# Patient Record
Sex: MALE | Race: OTHER | NOT HISPANIC OR LATINO | Employment: OTHER | URBAN - METROPOLITAN AREA
[De-identification: names, ages, dates, MRNs, and addresses within clinical notes are randomized per-mention and may not be internally consistent; named-entity substitution may affect disease eponyms.]

---

## 2022-02-15 ENCOUNTER — OFFICE VISIT (OUTPATIENT)
Dept: PODIATRY | Facility: CLINIC | Age: 82
End: 2022-02-15
Payer: MEDICARE

## 2022-02-15 VITALS
HEIGHT: 70 IN | BODY MASS INDEX: 24.68 KG/M2 | DIASTOLIC BLOOD PRESSURE: 69 MMHG | HEART RATE: 70 BPM | WEIGHT: 172.38 LBS | SYSTOLIC BLOOD PRESSURE: 121 MMHG

## 2022-02-15 DIAGNOSIS — L60.0 INGROWN NAIL: ICD-10-CM

## 2022-02-15 DIAGNOSIS — E11.49 TYPE II DIABETES MELLITUS WITH NEUROLOGICAL MANIFESTATIONS: Primary | ICD-10-CM

## 2022-02-15 DIAGNOSIS — L60.9 DISEASE OF NAIL: ICD-10-CM

## 2022-02-15 PROBLEM — M19.90 ARTHRITIS: Status: ACTIVE | Noted: 2022-02-15

## 2022-02-15 PROBLEM — M10.9 GOUT: Status: ACTIVE | Noted: 2022-02-15

## 2022-02-15 PROBLEM — I10 BENIGN ESSENTIAL HTN: Status: ACTIVE | Noted: 2021-08-28

## 2022-02-15 PROBLEM — J45.909 ASTHMA: Status: ACTIVE | Noted: 2022-02-15

## 2022-02-15 PROBLEM — I25.10 CORONARY ARTERY DISEASE INVOLVING NATIVE CORONARY ARTERY OF NATIVE HEART WITHOUT ANGINA PECTORIS: Status: ACTIVE | Noted: 2021-08-28

## 2022-02-15 PROBLEM — I73.9 PERIPHERAL ARTERIAL DISEASE: Status: ACTIVE | Noted: 2022-02-15

## 2022-02-15 PROBLEM — E11.65 TYPE 2 DIABETES MELLITUS WITH HYPERGLYCEMIA, WITHOUT LONG-TERM CURRENT USE OF INSULIN: Status: ACTIVE | Noted: 2021-08-28

## 2022-02-15 PROCEDURE — 99203 OFFICE O/P NEW LOW 30 MIN: CPT | Mod: S$GLB,,, | Performed by: PODIATRIST

## 2022-02-15 PROCEDURE — 3074F PR MOST RECENT SYSTOLIC BLOOD PRESSURE < 130 MM HG: ICD-10-PCS | Mod: CPTII,S$GLB,, | Performed by: PODIATRIST

## 2022-02-15 PROCEDURE — 1159F PR MEDICATION LIST DOCUMENTED IN MEDICAL RECORD: ICD-10-PCS | Mod: CPTII,S$GLB,, | Performed by: PODIATRIST

## 2022-02-15 PROCEDURE — 1125F AMNT PAIN NOTED PAIN PRSNT: CPT | Mod: CPTII,S$GLB,, | Performed by: PODIATRIST

## 2022-02-15 PROCEDURE — 1159F MED LIST DOCD IN RCRD: CPT | Mod: CPTII,S$GLB,, | Performed by: PODIATRIST

## 2022-02-15 PROCEDURE — 99203 PR OFFICE/OUTPT VISIT, NEW, LEVL III, 30-44 MIN: ICD-10-PCS | Mod: S$GLB,,, | Performed by: PODIATRIST

## 2022-02-15 PROCEDURE — 3074F SYST BP LT 130 MM HG: CPT | Mod: CPTII,S$GLB,, | Performed by: PODIATRIST

## 2022-02-15 PROCEDURE — 3078F PR MOST RECENT DIASTOLIC BLOOD PRESSURE < 80 MM HG: ICD-10-PCS | Mod: CPTII,S$GLB,, | Performed by: PODIATRIST

## 2022-02-15 PROCEDURE — 3078F DIAST BP <80 MM HG: CPT | Mod: CPTII,S$GLB,, | Performed by: PODIATRIST

## 2022-02-15 PROCEDURE — 1125F PR PAIN SEVERITY QUANTIFIED, PAIN PRESENT: ICD-10-PCS | Mod: CPTII,S$GLB,, | Performed by: PODIATRIST

## 2022-02-15 RX ORDER — OMEPRAZOLE 20 MG/1
20 CAPSULE, DELAYED RELEASE ORAL DAILY
COMMUNITY

## 2022-02-15 RX ORDER — ASPIRIN 81 MG/1
81 TABLET ORAL
COMMUNITY
Start: 2021-09-03

## 2022-02-15 RX ORDER — GABAPENTIN 400 MG/1
400 CAPSULE ORAL 4 TIMES DAILY
COMMUNITY
Start: 2021-10-18

## 2022-02-15 RX ORDER — METFORMIN HYDROCHLORIDE 500 MG/1
TABLET, EXTENDED RELEASE ORAL
COMMUNITY
Start: 2022-02-08

## 2022-02-15 RX ORDER — TAMSULOSIN HYDROCHLORIDE 0.4 MG/1
CAPSULE ORAL
COMMUNITY
Start: 2021-04-28

## 2022-02-15 RX ORDER — FLUTICASONE PROPIONATE AND SALMETEROL XINAFOATE 45; 21 UG/1; UG/1
2 AEROSOL, METERED RESPIRATORY (INHALATION) DAILY
COMMUNITY
Start: 2021-07-01

## 2022-02-15 RX ORDER — MONTELUKAST SODIUM 10 MG/1
TABLET ORAL
COMMUNITY
Start: 2022-01-28

## 2022-02-15 RX ORDER — CLOPIDOGREL BISULFATE 75 MG/1
75 TABLET ORAL DAILY
COMMUNITY
Start: 2021-12-13

## 2022-02-15 RX ORDER — EVOLOCUMAB 140 MG/ML
140 INJECTION, SOLUTION SUBCUTANEOUS
COMMUNITY
Start: 2022-01-17

## 2022-02-15 NOTE — PROCEDURES
"Routine Foot Care    Date/Time: 2/15/2022 10:45 AM  Performed by: Calixto Escalante DPM  Authorized by: Calixto Escalante DPM     Time out: Immediately prior to procedure a "time out" was called to verify the correct patient, procedure, equipment, support staff and site/side marked as required.    Consent Done?:  Yes (Verbal)  Hyperkeratotic Skin Lesions?: No      Nail Care Type:  Debride  Location(s): All  (Left 1st Toe, Left 3rd Toe, Left 2nd Toe, Left 4th Toe, Left 5th Toe, Right 1st Toe, Right 2nd Toe, Right 3rd Toe, Right 4th Toe and Right 5th Toe)  Patient tolerance:  Patient tolerated the procedure well with no immediate complications      "

## 2022-02-15 NOTE — PROGRESS NOTES
Subjective:       Patient ID: Devan Bautista is a 81 y.o. male.    Chief Complaint: Ingrown Toenail (Possible right first ingrown toenail), Diabetic Foot Exam, and Nail Care    Patient presents to [facility] complaining of possible ingrown toenail right first digit. Patient states that he has been taking bactrim for possible infection to right first digit without any complaints. Patient denies current pain or drainage from the area. He does report possible trauma to the nail plate. 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    PCP:  Marco Brown DO last visit 1/2022    Past Medical History:   Diagnosis Date    Diabetes mellitus     PAD (peripheral artery disease)      History reviewed. No pertinent surgical history.  History reviewed. No pertinent family history.  Social History     Socioeconomic History    Marital status:        Current Outpatient Medications   Medication Sig Dispense Refill    clopidogreL (PLAVIX) 75 mg tablet Take 75 mg by mouth once daily.      evolocumab (REPATHA SYRINGE) 140 mg/mL Syrg 140 mg.      fluticasone propion-salmeterol 45-21 mcg/dose (ADVAIR HFA) 45-21 mcg/actuation HFAA inhaler Inhale 2 puffs into the lungs once daily.      gabapentin (NEURONTIN) 400 MG capsule Take 400 mg by mouth 4 (four) times daily.      metFORMIN (GLUCOPHAGE-XR) 500 MG ER 24hr tablet   0 Refill(s), Maintenance      montelukast (SINGULAIR) 10 mg tablet   = 1 tab, Oral, qPM, # 90 tab, 0 Refill(s), Pharmacy: [facility], 177, cm, 01/17/22 8:23:00 CST, Height/Length Measured, 77.3, kg, 01/17/22 8:23:00 CST, Weight Dosing      omeprazole (PRILOSEC) 20 MG capsule Take 20 mg by mouth once daily.      tamsulosin (FLOMAX) 0.4 mg Cap   See Instructions, TAKE 1 CAPSULE EVERY DAY, # 90 cap, 3 Refill(s), Maintenance, Pharmacy: [facility], 177, cm, 04/12/21 8:15:00 CDT, Height/Length Measured, 77.8, kg, 04/12/21 8:15:00 CDT, Weight Dosing   "    aspirin (ECOTRIN) 81 MG EC tablet Take 81 mg by mouth.       No current facility-administered medications for this visit.     Review of patient's allergies indicates:   Allergen Reactions    Statins-hmg-coa reductase inhibitors      Other reaction(s): Leg pain    Acetaminophen Hives       Review of Systems   Constitutional: Negative for chills and fever.   Gastrointestinal: Positive for nausea. Negative for diarrhea and vomiting.   Neurological: Positive for numbness (in bilateral feet).       Objective:      Vitals:    02/15/22 1103   BP: 121/69   Pulse: 70   Weight: 78.2 kg (172 lb 6.4 oz)   Height: 5' 9.5" (1.765 m)     Physical Exam  Constitutional:       General: He is not in acute distress.     Appearance: Normal appearance. He is not ill-appearing.   HENT:      Head: Normocephalic.   Pulmonary:      Effort: No respiratory distress.   Musculoskeletal:         General: No swelling.      Right lower leg: No edema.      Left lower leg: No edema.   Skin:     Capillary Refill: Capillary refill takes 2 to 3 seconds.   Neurological:      Mental Status: He is alert and oriented to person, place, and time.   Psychiatric:         Mood and Affect: Mood normal.         Behavior: Behavior normal.         Thought Content: Thought content normal.       Pedal Examination:  Neuro: Light touch sensation intact both feet, [place] ely decreased to distal digits  Vasc: DP and PT pulses palpable 2/4. No hair growth noted. Capillary fill time less than 3 seconds, skin thin shiny and atrophic, + varicosities noted, skin temperature warm to cool form proximal to distal bilateral foot  Musc: 5/5 muscle strength, minimal pain on palpation right first digit nail plate/ proximal medial nail fold; no masses noted bilateral foot  Derm: Evidence of mild hematoma to right first digit nail plate, mild proximal - central onycholysis noted, no drainage, mild erythema noted proximal medial nail fold right first digit; no open wounds, " no rashes noted; Nails are thickened, elongated with subungual debris 1-5 bilateral foot       Assessment:       1. Type II diabetes mellitus with neurological manifestations    2. Ingrown nail - Right Foot    3. Disease of nail        Plan:       1. Patient examined and evaluated  2. Discussed diabetes mellitus and proper fitting shoe gear, daily foot monitoring and adherence to diabetic medication regimen  3. Patient was advised to complete oral Bactrim to resolve any possible infection right 1st digit nail plate.  4. Patient will continue to monitor right 1st digit nail plate for possible ingrown toenail he was made aware secondary to his history of vascular changes and diabetes mellitus we will postpone removing the nail at this time.  5. Patient will return to clinic in 3 weeks for further evaluation for right first digit nail plate with pending possible nail avulsion/ patient will return to clinic in 3 months for diabetic foot examination

## 2022-03-09 ENCOUNTER — OFFICE VISIT (OUTPATIENT)
Dept: PODIATRY | Facility: CLINIC | Age: 82
End: 2022-03-09
Payer: MEDICARE

## 2022-03-09 VITALS
SYSTOLIC BLOOD PRESSURE: 130 MMHG | WEIGHT: 174 LBS | HEART RATE: 83 BPM | DIASTOLIC BLOOD PRESSURE: 72 MMHG | BODY MASS INDEX: 24.91 KG/M2 | HEIGHT: 70 IN

## 2022-03-09 DIAGNOSIS — L60.0 INGROWN NAIL: ICD-10-CM

## 2022-03-09 DIAGNOSIS — E11.42 DIABETIC POLYNEUROPATHY ASSOCIATED WITH TYPE 2 DIABETES MELLITUS: ICD-10-CM

## 2022-03-09 DIAGNOSIS — S90.111D CONTUSION OF RIGHT GREAT TOE WITHOUT DAMAGE TO NAIL, SUBSEQUENT ENCOUNTER: Primary | ICD-10-CM

## 2022-03-09 DIAGNOSIS — E11.51 TYPE II DIABETES MELLITUS WITH PERIPHERAL CIRCULATORY DISORDER: ICD-10-CM

## 2022-03-09 PROCEDURE — 1126F AMNT PAIN NOTED NONE PRSNT: CPT | Mod: CPTII,S$GLB,, | Performed by: PODIATRIST

## 2022-03-09 PROCEDURE — 1126F PR PAIN SEVERITY QUANTIFIED, NO PAIN PRESENT: ICD-10-PCS | Mod: CPTII,S$GLB,, | Performed by: PODIATRIST

## 2022-03-09 PROCEDURE — 99212 OFFICE O/P EST SF 10 MIN: CPT | Mod: S$GLB,,, | Performed by: PODIATRIST

## 2022-03-09 PROCEDURE — 3075F PR MOST RECENT SYSTOLIC BLOOD PRESS GE 130-139MM HG: ICD-10-PCS | Mod: CPTII,S$GLB,, | Performed by: PODIATRIST

## 2022-03-09 PROCEDURE — 99212 PR OFFICE/OUTPT VISIT, EST, LEVL II, 10-19 MIN: ICD-10-PCS | Mod: S$GLB,,, | Performed by: PODIATRIST

## 2022-03-09 PROCEDURE — 3075F SYST BP GE 130 - 139MM HG: CPT | Mod: CPTII,S$GLB,, | Performed by: PODIATRIST

## 2022-03-09 PROCEDURE — 1159F MED LIST DOCD IN RCRD: CPT | Mod: CPTII,S$GLB,, | Performed by: PODIATRIST

## 2022-03-09 PROCEDURE — 3078F DIAST BP <80 MM HG: CPT | Mod: CPTII,S$GLB,, | Performed by: PODIATRIST

## 2022-03-09 PROCEDURE — 3078F PR MOST RECENT DIASTOLIC BLOOD PRESSURE < 80 MM HG: ICD-10-PCS | Mod: CPTII,S$GLB,, | Performed by: PODIATRIST

## 2022-03-09 PROCEDURE — 1159F PR MEDICATION LIST DOCUMENTED IN MEDICAL RECORD: ICD-10-PCS | Mod: CPTII,S$GLB,, | Performed by: PODIATRIST

## 2022-03-09 NOTE — PROGRESS NOTES
Subjective:       Patient ID: Devan Bautista is a 81 y.o. male.    Chief Complaint: Follow-up    Patient presents to clinic for follow-up evaluation of right 1st digit nail pain.  Patient reports improved symptoms from the area and states that the tenderness has resolved.  Patient states that he completed his oral Bactrim with no adverse effects and improvement of the visual appearance of his right 1st digit.  Patient reports previous traumatic event to the right 1st digit nail plate which may have led to his symptoms. Patient states that he is adhering strictly to his diabetic medication regimen.  Patient denies any other pedal complaints.      Past Medical History:   Diagnosis Date    Diabetes mellitus     PAD (peripheral artery disease)      History reviewed. No pertinent surgical history.  History reviewed. No pertinent family history.  Social History     Socioeconomic History    Marital status:    Tobacco Use    Smoking status: Never Smoker    Smokeless tobacco: Never Used   Substance and Sexual Activity    Alcohol use: Not Currently    Drug use: Never    Sexual activity: Yes       Current Outpatient Medications   Medication Sig Dispense Refill    aspirin (ECOTRIN) 81 MG EC tablet Take 81 mg by mouth.      clopidogreL (PLAVIX) 75 mg tablet Take 75 mg by mouth once daily.      evolocumab (REPATHA SYRINGE) 140 mg/mL Syrg 140 mg.      fluticasone propion-salmeterol 45-21 mcg/dose (ADVAIR HFA) 45-21 mcg/actuation HFAA inhaler Inhale 2 puffs into the lungs once daily.      gabapentin (NEURONTIN) 400 MG capsule Take 400 mg by mouth 4 (four) times daily.      metFORMIN (GLUCOPHAGE-XR) 500 MG ER 24hr tablet   0 Refill(s), Maintenance      montelukast (SINGULAIR) 10 mg tablet   = 1 tab, Oral, qPM, # 90 tab, 0 Refill(s), Pharmacy: [facility], 177, cm, 01/17/22 8:23:00 CST, Height/Length Measured, 77.3, kg, 01/17/22 8:23:00 CST, Weight Dosing      omeprazole (PRILOSEC) 20 MG capsule  "Take 20 mg by mouth once daily.      tamsulosin (FLOMAX) 0.4 mg Cap   See Instructions, TAKE 1 CAPSULE EVERY DAY, # 90 cap, 3 Refill(s), Maintenance, [facility], 177, cm, 04/12/21 8:15:00 CDT, Height/Length Measured, 77.8, kg, 04/12/21 8:15:00 CDT, Weight Dosing       No current facility-administered medications for this visit.     Review of patient's allergies indicates:   Allergen Reactions    Statins-hmg-coa reductase inhibitors      Other reaction(s): Leg pain    Acetaminophen Hives       Review of Systems   Constitutional: Negative for chills and fever.   Respiratory: Negative for cough and shortness of breath.    Cardiovascular: Negative for chest pain and leg swelling.   Gastrointestinal: Negative for constipation, nausea and vomiting.       Objective:      Vitals:    03/09/22 0939   BP: 130/72   Pulse: 83   Weight: 78.9 kg (174 lb)   Height: 5' 9.5" (1.765 m)     Physical Exam  Vitals reviewed.   Constitutional:       General: He is not in acute distress.     Appearance: He is not ill-appearing.   HENT:      Head: Normocephalic.   Pulmonary:      Effort: Pulmonary effort is normal. No respiratory distress.   Musculoskeletal:      Right lower leg: No edema.      Left lower leg: No edema.   Skin:     Capillary Refill: Capillary refill takes less than 2 seconds.   Neurological:      Mental Status: He is alert and oriented to person, place, and time.   Psychiatric:         Mood and Affect: Mood normal.         Behavior: Behavior normal.         Thought Content: Thought content normal.       Pedal Examination:  Neuro:  Protective and light touch sensation decreased to the bilateral lower extremity, no paresthesias noted  Vasc:  DP and PT pulses palpable 2/4 bilateral foot, capillary fill time less than 3 seconds bilateral digit, pedal hair growth absent, no edema noted to the foot or ankle, skin temperature gradient warm to cool proximal to distal bilateral foot  Musc:  5/5 muscle " strength noted to bilateral foot, no masses or tenderness noted bilateral foot, no pain on deep palpation of the right 1st digit nail plate medially laterally or dorsally  Derm:  Evidence of lysis of a very small portion of the proximal medial nail fold right 1st digit nail plate with evidence of previous mild hematoma encompassing about 1% of the nail plate, nails 1 through 5 bilateral within normal limits of length but are discolored and thickened.  No open lesions or rashes noted bilateral foot, pedal skin thin and shiny, no evidence of increased ingrown nature or further loosening of right 1st digit nail plate at the medial proximal nail fold; no signs of infection noted to the right 1st digit nail plate           Assessment:       1. Contusion of right great toe without damage to nail, subsequent encounter    2. Ingrown nail    3. Type II diabetes mellitus with peripheral circulatory disorder    4. Diabetic polyneuropathy associated with type 2 diabetes mellitus        Plan:       1. Patient was examined and evaluated   2. The patient was made aware that all symptoms have resolved from his right 1st digit nail plate following a previous possible nail contusion/ ingrown toenail; he was made aware that he more likely suffered a toe contusion which has resolved and shows no signs of infection  3. Patient will continue to monitor the area for progression of symptoms; he will contact the clinic in the event of any adverse event  4. Patient will follow-up as previously scheduled 3 month diabetic foot examination appointment or p.r.n. for complaints

## 2022-06-09 ENCOUNTER — OFFICE VISIT (OUTPATIENT)
Dept: PODIATRY | Facility: CLINIC | Age: 82
End: 2022-06-09
Payer: MEDICARE

## 2022-06-09 VITALS
BODY MASS INDEX: 24.91 KG/M2 | HEART RATE: 83 BPM | WEIGHT: 174 LBS | HEIGHT: 70 IN | DIASTOLIC BLOOD PRESSURE: 74 MMHG | SYSTOLIC BLOOD PRESSURE: 125 MMHG

## 2022-06-09 DIAGNOSIS — E11.42 DIABETIC POLYNEUROPATHY ASSOCIATED WITH TYPE 2 DIABETES MELLITUS: Primary | ICD-10-CM

## 2022-06-09 DIAGNOSIS — I73.9 PERIPHERAL VASCULAR DISEASE: ICD-10-CM

## 2022-06-09 DIAGNOSIS — E11.49 TYPE II DIABETES MELLITUS WITH NEUROLOGICAL MANIFESTATIONS: ICD-10-CM

## 2022-06-09 DIAGNOSIS — L60.9 DISEASE OF NAIL: ICD-10-CM

## 2022-06-09 PROCEDURE — 11721 DEBRIDE NAIL 6 OR MORE: CPT | Mod: Q9,S$GLB,, | Performed by: PODIATRIST

## 2022-06-09 PROCEDURE — 3078F PR MOST RECENT DIASTOLIC BLOOD PRESSURE < 80 MM HG: ICD-10-PCS | Mod: CPTII,S$GLB,, | Performed by: PODIATRIST

## 2022-06-09 PROCEDURE — 1126F PR PAIN SEVERITY QUANTIFIED, NO PAIN PRESENT: ICD-10-PCS | Mod: CPTII,S$GLB,, | Performed by: PODIATRIST

## 2022-06-09 PROCEDURE — 3078F DIAST BP <80 MM HG: CPT | Mod: CPTII,S$GLB,, | Performed by: PODIATRIST

## 2022-06-09 PROCEDURE — 1160F PR REVIEW ALL MEDS BY PRESCRIBER/CLIN PHARMACIST DOCUMENTED: ICD-10-PCS | Mod: CPTII,S$GLB,, | Performed by: PODIATRIST

## 2022-06-09 PROCEDURE — 99213 OFFICE O/P EST LOW 20 MIN: CPT | Mod: 25,S$GLB,, | Performed by: PODIATRIST

## 2022-06-09 PROCEDURE — 3074F SYST BP LT 130 MM HG: CPT | Mod: CPTII,S$GLB,, | Performed by: PODIATRIST

## 2022-06-09 PROCEDURE — 3051F PR MOST RECENT HEMOGLOBIN A1C LEVEL 7.0 - < 8.0%: ICD-10-PCS | Mod: CPTII,S$GLB,, | Performed by: PODIATRIST

## 2022-06-09 PROCEDURE — 3074F PR MOST RECENT SYSTOLIC BLOOD PRESSURE < 130 MM HG: ICD-10-PCS | Mod: CPTII,S$GLB,, | Performed by: PODIATRIST

## 2022-06-09 PROCEDURE — 1159F PR MEDICATION LIST DOCUMENTED IN MEDICAL RECORD: ICD-10-PCS | Mod: CPTII,S$GLB,, | Performed by: PODIATRIST

## 2022-06-09 PROCEDURE — 1159F MED LIST DOCD IN RCRD: CPT | Mod: CPTII,S$GLB,, | Performed by: PODIATRIST

## 2022-06-09 PROCEDURE — 1126F AMNT PAIN NOTED NONE PRSNT: CPT | Mod: CPTII,S$GLB,, | Performed by: PODIATRIST

## 2022-06-09 PROCEDURE — 1160F RVW MEDS BY RX/DR IN RCRD: CPT | Mod: CPTII,S$GLB,, | Performed by: PODIATRIST

## 2022-06-09 PROCEDURE — 99213 PR OFFICE/OUTPT VISIT, EST, LEVL III, 20-29 MIN: ICD-10-PCS | Mod: 25,S$GLB,, | Performed by: PODIATRIST

## 2022-06-09 PROCEDURE — 11721 ROUTINE FOOT CARE: ICD-10-PCS | Mod: Q9,S$GLB,, | Performed by: PODIATRIST

## 2022-06-09 PROCEDURE — 3051F HG A1C>EQUAL 7.0%<8.0%: CPT | Mod: CPTII,S$GLB,, | Performed by: PODIATRIST

## 2022-06-15 NOTE — PROGRESS NOTES
Subjective:      Patient ID: Devan Bautista is a 82 y.o. male.    Chief Complaint: Follow-up and Diabetes Mellitus    Devan is a 82 y.o. male who presents to the clinic for evaluation and treatment of high risk feet. Devan has a past medical history of Diabetes mellitus and PAD (peripheral artery disease). The patient's chief complaint is long, thick toenails. This patient has documented high risk feet requiring routine maintenance secondary to peripheral neuropathy.    PCP: Marco Brown DO    Date Last Seen by PCP: 05/18/2022    Current shoe gear:  Affected Foot: Extra depth shoes     Unaffected Foot: Extra depth shoes    Hemoglobin A1C   Date Value Ref Range Status   08/28/2021 7.2 (H) 4.2 - 6.3 % Final       Review of Systems   Constitutional: Negative for chills and fever.   Cardiovascular: Negative for chest pain.   Respiratory: Negative for cough.    Gastrointestinal: Negative for diarrhea, nausea and vomiting.   Neurological: Positive for numbness (bilateral foot) and paresthesias.           Objective:      Physical Exam  Vitals reviewed.   Constitutional:       General: He is not in acute distress.     Appearance: Normal appearance. He is not ill-appearing.   HENT:      Head: Normocephalic.      Nose: Nose normal.   Cardiovascular:      Rate and Rhythm: Normal rate.   Pulmonary:      Effort: Pulmonary effort is normal. No respiratory distress.   Skin:     Capillary Refill: Capillary refill takes 2 to 3 seconds.   Neurological:      Mental Status: He is alert and oriented to person, place, and time.   Psychiatric:         Mood and Affect: Mood normal.         Behavior: Behavior normal.         Thought Content: Thought content normal.         Judgment: Judgment normal.       Neurologic:  Light touch sensation intact bilateral foot, protective sensation is diminished to distal aspect the digits bilateral foot, positive paresthesias noted bilateral foot  Musculoskeletal:  5/5 muscle strength noted bilateral  foot, ankle joint range of motion is reduced without pain  Vascular:  DP and PT pulses palpable 2/4 bilateral foot, pedal hair growth is absent bilateral foot, positive varicose [place] and telangiectasias bilateral foot, skin temperature gradient warm to cool from proximal distal bilateral foot  Dermatologic:  Nails 1 through 5 bilateral thickened elongated and discolored with the presence of subungual debris, no open lesions noted bilateral foot, no rashes noted bilateral foot, pedal skin is thin shiny atrophic          Assessment:       Encounter Diagnoses   Name Primary?    Diabetic polyneuropathy associated with type 2 diabetes mellitus Yes    Type II diabetes mellitus with neurological manifestations     Disease of nail     Peripheral vascular disease          Plan:       Devan was seen today for follow-up and diabetes mellitus.    Diagnoses and all orders for this visit:    Diabetic polyneuropathy associated with type 2 diabetes mellitus    Type II diabetes mellitus with neurological manifestations    Disease of nail    Peripheral vascular disease      I counseled the patient on his conditions, their implications and medical management.      1. Patient was examined and evaluated  2. Patient was advised to monitor feet on a daily basis, continue proper glycemic control, continue attempts in lawn hemoglobin A1c, and adherence to diabetic medication regimen.  Routine Foot Care    Date/Time: 6/9/2022 11:00 AM  Performed by: Calixto Escalante DPM  Authorized by: Calixto Escalante DPM     Consent Done?:  Yes (Verbal)  Hyperkeratotic Skin Lesions?: No      Nail Care Type:  Debride  Location(s): All  (Left 1st Toe, Left 3rd Toe, Left 2nd Toe, Left 4th Toe, Left 5th Toe, Right 1st Toe, Right 2nd Toe, Right 3rd Toe, Right 4th Toe and Right 5th Toe)  Patient tolerance:  Patient tolerated the procedure well with no immediate complications      3. Patient advised continue with topical treatment for nail fungal  changes.  Patient will continue with oral medication for improvement of peripheral neuropathy.  Patient was advised to elevate lower extremity when at rest and consider compression therapy for improvement of vascular changes.  4. Patient will follow-up in 3 months or p.r.n. for foot complaints  .

## 2022-10-05 ENCOUNTER — OFFICE VISIT (OUTPATIENT)
Dept: PODIATRY | Facility: CLINIC | Age: 82
End: 2022-10-05
Payer: MEDICARE

## 2022-10-05 VITALS
BODY MASS INDEX: 24.91 KG/M2 | DIASTOLIC BLOOD PRESSURE: 68 MMHG | HEIGHT: 70 IN | WEIGHT: 174 LBS | SYSTOLIC BLOOD PRESSURE: 128 MMHG | HEART RATE: 78 BPM

## 2022-10-05 DIAGNOSIS — E11.51 TYPE II DIABETES MELLITUS WITH PERIPHERAL CIRCULATORY DISORDER: ICD-10-CM

## 2022-10-05 DIAGNOSIS — I73.9 PERIPHERAL VASCULAR DISEASE: Primary | ICD-10-CM

## 2022-10-05 DIAGNOSIS — L60.9 DISEASE OF NAIL: ICD-10-CM

## 2022-10-05 PROCEDURE — 1159F PR MEDICATION LIST DOCUMENTED IN MEDICAL RECORD: ICD-10-PCS | Mod: CPTII,S$GLB,, | Performed by: PODIATRIST

## 2022-10-05 PROCEDURE — 3078F PR MOST RECENT DIASTOLIC BLOOD PRESSURE < 80 MM HG: ICD-10-PCS | Mod: CPTII,S$GLB,, | Performed by: PODIATRIST

## 2022-10-05 PROCEDURE — 1159F MED LIST DOCD IN RCRD: CPT | Mod: CPTII,S$GLB,, | Performed by: PODIATRIST

## 2022-10-05 PROCEDURE — 99213 PR OFFICE/OUTPT VISIT, EST, LEVL III, 20-29 MIN: ICD-10-PCS | Mod: 25,S$GLB,, | Performed by: PODIATRIST

## 2022-10-05 PROCEDURE — 11721 ROUTINE FOOT CARE: ICD-10-PCS | Mod: Q9,S$GLB,, | Performed by: PODIATRIST

## 2022-10-05 PROCEDURE — 11721 DEBRIDE NAIL 6 OR MORE: CPT | Mod: Q9,S$GLB,, | Performed by: PODIATRIST

## 2022-10-05 PROCEDURE — 1125F AMNT PAIN NOTED PAIN PRSNT: CPT | Mod: CPTII,S$GLB,, | Performed by: PODIATRIST

## 2022-10-05 PROCEDURE — 3074F PR MOST RECENT SYSTOLIC BLOOD PRESSURE < 130 MM HG: ICD-10-PCS | Mod: CPTII,S$GLB,, | Performed by: PODIATRIST

## 2022-10-05 PROCEDURE — 1125F PR PAIN SEVERITY QUANTIFIED, PAIN PRESENT: ICD-10-PCS | Mod: CPTII,S$GLB,, | Performed by: PODIATRIST

## 2022-10-05 PROCEDURE — 3078F DIAST BP <80 MM HG: CPT | Mod: CPTII,S$GLB,, | Performed by: PODIATRIST

## 2022-10-05 PROCEDURE — 99213 OFFICE O/P EST LOW 20 MIN: CPT | Mod: 25,S$GLB,, | Performed by: PODIATRIST

## 2022-10-05 PROCEDURE — 3074F SYST BP LT 130 MM HG: CPT | Mod: CPTII,S$GLB,, | Performed by: PODIATRIST

## 2022-10-05 NOTE — PROGRESS NOTES
Subjective:      Patient ID: Devan Bautista is a 82 y.o. male.    Chief Complaint: Diabetes Mellitus and Routine Foot Care    Devan is a 82 y.o. male who presents to the clinic for evaluation and treatment of high risk feet. Devan has a past medical history of Diabetes mellitus and PAD (peripheral artery disease). The patient's chief complaint is long, thick toenails. This patient has documented high risk feet requiring routine maintenance secondary to peripheral neuropathy.  Patient reports pain and tenderness in the right 1st digit nail plate.    PCP: Marco Brown DO    Date Last Seen by PCP: 08/31/2022    Current shoe gear:  Affected Foot: Tennis shoes     Unaffected Foot: Tennis shoes    Hemoglobin A1C   Date Value Ref Range Status   08/28/2021 7.2 (H) 4.2 - 6.3 % Final       Review of Systems   Constitutional: Negative for chills and fever.   Cardiovascular:  Negative for chest pain and leg swelling.   Respiratory:  Negative for cough and shortness of breath.    Gastrointestinal:  Negative for diarrhea, nausea and vomiting.         Objective:      Physical Exam  Vitals reviewed.   Constitutional:       General: He is not in acute distress.     Appearance: Normal appearance. He is not ill-appearing.   HENT:      Head: Normocephalic.      Nose: Nose normal.   Cardiovascular:      Rate and Rhythm: Normal rate.   Pulmonary:      Effort: Pulmonary effort is normal. No respiratory distress.   Skin:     Capillary Refill: Capillary refill takes 2 to 3 seconds.   Neurological:      Mental Status: He is alert and oriented to person, place, and time.   Psychiatric:         Mood and Affect: Mood normal.         Behavior: Behavior normal.         Thought Content: Thought content normal.        Neurologic:  Light touch sensation intact bilateral foot, protective sensation is diminished to distal aspect the digits bilateral foot, positive paresthesias reported bilateral foot  Musculoskeletal:  5/5 muscle strength noted  bilateral foot, ankle joint range of motion is reduced without pain  Vascular:  DP and PT pulses palpable 2/4 bilateral foot, pedal hair growth is absent bilateral foot, positive varicosities and telangiectasias bilateral foot, skin temperature gradient warm to cool from proximal distal bilateral foot  Dermatologic:  Nails 1 through 5 bilateral thickened elongated and discolored with the presence of subungual debris, no open lesions noted bilateral foot, no rashes noted bilateral foot, pedal skin is thin shiny atrophic, right 1st digit nail plate shows signs of previous trauma with lateral splitting and thickening of the nail plate about 1/2 the distance no lysis of nail plate noted however        Assessment:       Encounter Diagnoses   Name Primary?    Peripheral vascular disease Yes    Type II diabetes mellitus with peripheral circulatory disorder     Disease of nail          Plan:       Devan was seen today for diabetes mellitus and routine foot care.    Diagnoses and all orders for this visit:    Peripheral vascular disease    Type II diabetes mellitus with peripheral circulatory disorder    Disease of nail    I counseled the patient on his conditions, their implications and medical management.        1. Patient was examined and evaluated  2. Patient made aware of double nail plate noted to right 1st digit which may be related to previous contusion to the right 1st digit.  Upon debridement of that nail plate the top layer of the nail fell off in the area looks more anatomically correct and patient reports relief of pain symptoms from that area  Routine Foot Care    Date/Time: 10/5/2022 10:15 AM  Performed by: Calixto Escalante DPM  Authorized by: Calixto Escalante DPM     Consent Done?:  Yes (Verbal)  Hyperkeratotic Skin Lesions?: No      Nail Care Type:  Debride  Location(s): All  (Left 1st Toe, Left 3rd Toe, Left 2nd Toe, Left 4th Toe, Left 5th Toe, Right 1st Toe, Right 2nd Toe, Right 3rd Toe, Right 4th Toe  and Right 5th Toe)  Patient tolerance:  Patient tolerated the procedure well with no immediate complications   3. Patient was advised to continue with daily monitoring of feet.  Patient will continue with comfortable shoe gear both inside and outside the home.  Patient will attempt proper glycemic control, lowering of hemoglobin A1c, and adherence to diabetic medication regimen  4. Patient was advised to begin topical OTC fungal therapies including Vicks vapor rub.    5. Patient will follow-up in 3 months or p.r.n. foot complaints

## 2023-08-17 ENCOUNTER — OFFICE VISIT (OUTPATIENT)
Dept: PODIATRY | Facility: CLINIC | Age: 83
End: 2023-08-17
Payer: MEDICARE

## 2023-08-17 VITALS
WEIGHT: 174 LBS | BODY MASS INDEX: 24.91 KG/M2 | HEIGHT: 70 IN | DIASTOLIC BLOOD PRESSURE: 72 MMHG | SYSTOLIC BLOOD PRESSURE: 124 MMHG | HEART RATE: 79 BPM

## 2023-08-17 DIAGNOSIS — E11.51 TYPE II DIABETES MELLITUS WITH PERIPHERAL CIRCULATORY DISORDER: ICD-10-CM

## 2023-08-17 DIAGNOSIS — L60.9 DISEASE OF NAIL: ICD-10-CM

## 2023-08-17 DIAGNOSIS — E11.42 DIABETIC POLYNEUROPATHY ASSOCIATED WITH TYPE 2 DIABETES MELLITUS: Primary | ICD-10-CM

## 2023-08-17 PROCEDURE — 11721 ROUTINE FOOT CARE: ICD-10-PCS | Mod: Q9,S$GLB,, | Performed by: PODIATRIST

## 2023-08-17 PROCEDURE — 99499 NO LOS: ICD-10-PCS | Mod: S$GLB,,, | Performed by: PODIATRIST

## 2023-08-17 PROCEDURE — 11721 DEBRIDE NAIL 6 OR MORE: CPT | Mod: Q9,S$GLB,, | Performed by: PODIATRIST

## 2023-08-17 PROCEDURE — 99499 UNLISTED E&M SERVICE: CPT | Mod: S$GLB,,, | Performed by: PODIATRIST

## 2023-08-17 RX ORDER — IBUPROFEN 800 MG/1
800 TABLET ORAL EVERY 8 HOURS
COMMUNITY
Start: 2023-06-22

## 2023-08-17 RX ORDER — TRAMADOL HYDROCHLORIDE 50 MG/1
50 TABLET ORAL EVERY 6 HOURS PRN
COMMUNITY
Start: 2023-01-09

## 2023-08-17 RX ORDER — LATANOPROST 50 UG/ML
SOLUTION/ DROPS OPHTHALMIC
COMMUNITY
Start: 2023-06-26

## 2023-08-17 RX ORDER — EVOLOCUMAB 140 MG/ML
INJECTION, SOLUTION SUBCUTANEOUS
COMMUNITY
Start: 2023-08-01

## 2023-08-17 RX ORDER — ALBUTEROL SULFATE 90 UG/1
2 AEROSOL, METERED RESPIRATORY (INHALATION) 4 TIMES DAILY PRN
COMMUNITY
Start: 2023-05-01

## 2023-08-17 RX ORDER — METAXALONE 800 MG/1
800 TABLET ORAL 3 TIMES DAILY
COMMUNITY
Start: 2023-06-22

## 2023-08-17 NOTE — PROGRESS NOTES
Subjective:     Patient ID: Devan Bautista is a 83 y.o. male.    Chief Complaint: Diabetic Foot Exam    Devan is a 83 y.o. male who presents to the clinic for evaluation and treatment of high risk feet. Devan has a past medical history of Diabetes mellitus, Diabetes mellitus, type 2, and PAD (peripheral artery disease). The patient's chief complaint is long, thick toenails. This patient has documented high risk feet requiring routine maintenance secondary to peripheral neuropathy.    PCP: Marco Brown,     Date Last Seen by PCP: 06/22/2023    Current shoe gear:  Affected Foot: Tennis shoes     Unaffected Foot: Tennis shoes        Review of Systems   Constitutional: Negative for chills and fever.   Cardiovascular:  Negative for chest pain and leg swelling.   Respiratory:  Negative for cough and shortness of breath.    Gastrointestinal:  Negative for diarrhea, nausea and vomiting.   Neurological:  Positive for numbness and paresthesias.        Objective:     Physical Exam  Vitals reviewed.   Constitutional:       General: He is not in acute distress.     Appearance: Normal appearance. He is not ill-appearing.   HENT:      Head: Normocephalic.      Nose: Nose normal.   Cardiovascular:      Rate and Rhythm: Normal rate.   Pulmonary:      Effort: Pulmonary effort is normal. No respiratory distress.   Skin:     Capillary Refill: Capillary refill takes 2 to 3 seconds.   Neurological:      Mental Status: He is alert and oriented to person, place, and time.   Psychiatric:         Mood and Affect: Mood normal.         Behavior: Behavior normal.         Thought Content: Thought content normal.         Neurologic:  Light touch sensation intact bilateral foot, protective sensation is diminished to distal aspect the digits bilateral foot, positive paresthesias reported bilateral foot, left worse than right  Musculoskeletal:  5/5 muscle strength noted bilateral foot, ankle joint range of motion is reduced without  pain  Vascular:  DP and PT pulses palpable 2/4 bilateral foot, pedal hair growth is absent bilateral foot, positive varicosities and telangiectasias bilateral foot, skin temperature gradient warm to cool from proximal distal bilateral foot  Dermatologic:  Nails 1 through 5 bilateral thickened elongated and discolored with the presence of subungual debris, no open lesions noted bilateral foot, no rashes noted bilateral foot, pedal skin is thin shiny atrophic        Assessment:      Encounter Diagnoses   Name Primary?    Diabetic polyneuropathy associated with type 2 diabetes mellitus Yes    Type II diabetes mellitus with peripheral circulatory disorder     Disease of nail      Plan:     Dvean was seen today for diabetic foot exam.    Diagnoses and all orders for this visit:    Diabetic polyneuropathy associated with type 2 diabetes mellitus  -     Routine Foot Care    Type II diabetes mellitus with peripheral circulatory disorder  -     Routine Foot Care    Disease of nail  -     Routine Foot Care      I counseled the patient on his conditions, their implications and medical management.        1. Patient was examined and evaluated.    2. Discussed with patient fungal nail changes once again.  Patient was advised to apply topical conservative treatments such as Vicks vapor rub tea tree oil.    3. Discussed patient etiology diabetic neuropathy.  Patient was advised to continue oral gabapentin for improvement of symptoms.  Patient was advised that he can consider possible increase in dosage or frequency if neuropathic pain symptoms worsen over time.    Routine Foot Care    Date/Time: 8/17/2023 10:15 AM    Performed by: Calixto Escalante DPM  Authorized by: Calixto Escalante DPM    Consent Done?:  Yes (Verbal)    Nail Care Type:  Debride  Location(s): All  (Left 1st Toe, Left 3rd Toe, Left 2nd Toe, Left 4th Toe, Left 5th Toe, Right 1st Toe, Right 2nd Toe, Right 3rd Toe, Right 4th Toe and Right 5th Toe)  Patient  tolerance:  Patient tolerated the procedure well with no immediate complications        4. Patient was advised to continue with comfortable shoe gear both inside and outside the home.  5. Patient will continue with daily foot monitoring.  Patient will continue efforts proper glycemic control, lowering hemoglobin A1c, adherence to diabetic medication regimen

## 2025-04-09 ENCOUNTER — OFFICE VISIT (OUTPATIENT)
Dept: PODIATRY | Facility: CLINIC | Age: 85
End: 2025-04-09
Payer: MEDICARE

## 2025-04-09 VITALS — BODY MASS INDEX: 25.72 KG/M2 | WEIGHT: 179.63 LBS | HEIGHT: 70 IN

## 2025-04-09 DIAGNOSIS — E11.42 DIABETIC POLYNEUROPATHY ASSOCIATED WITH TYPE 2 DIABETES MELLITUS: Primary | ICD-10-CM

## 2025-04-09 DIAGNOSIS — L60.9 DISEASE OF NAIL: ICD-10-CM

## 2025-04-09 RX ORDER — NITROGLYCERIN 0.4 MG/1
0.4 TABLET SUBLINGUAL
COMMUNITY
Start: 2025-01-29

## 2025-04-09 NOTE — PROGRESS NOTES
Subjective:     Patient ID: Devan Bautista is a 84 y.o. male    Chief Complaint: Nail Care       Devan is a 84 y.o. male who presents to the clinic for evaluation and treatment of high risk feet. Devan has a past medical history of Diabetes mellitus, Diabetes mellitus, type 2, and PAD (peripheral artery disease). The patient's chief complaint is long, thick toenails. This patient has documented high risk feet requiring routine maintenance secondary to diabetes mellitis and those secondary complications of diabetes, as mentioned..    PCP: Marco Brown, DO    Date Last Seen by PCP: 03/2025    Current shoe gear:  Affected Foot: Tennis shoes     Unaffected Foot: Tennis shoes    Hemoglobin A1C   Date Value Ref Range Status   08/28/2021 7.2 (H) 4.2 - 6.3 % Final       Review of Systems   Constitutional: Negative.  Negative for chills and fever.   Respiratory:  Negative for cough and shortness of breath.    Cardiovascular:  Positive for leg swelling. Negative for chest pain.   Gastrointestinal:  Negative for diarrhea, nausea and vomiting.   Neurological:  Positive for tingling.        Objective:   Physical Exam  Vitals reviewed.   Constitutional:       General: He is not in acute distress.     Appearance: Normal appearance. He is not ill-appearing.   HENT:      Head: Normocephalic.      Nose: Nose normal.   Cardiovascular:      Pulses:           Dorsalis pedis pulses are 1+ on the right side and 1+ on the left side.        Posterior tibial pulses are 1+ on the right side and 1+ on the left side.   Pulmonary:      Effort: Pulmonary effort is normal. No respiratory distress.   Skin:     Capillary Refill: Capillary refill takes 2 to 3 seconds.   Neurological:      Mental Status: He is alert and oriented to person, place, and time.   Psychiatric:         Mood and Affect: Mood normal.         Behavior: Behavior normal.         Thought Content: Thought content normal.        Foot Exam    General  General Appearance: appears  stated age and healthy   Orientation: alert and oriented to person, place, and time   Affect: appropriate   Gait: antalgic   Assistance: cane use       Right Foot/Ankle     Inspection and Palpation  Swelling: dorsum   Skin Exam: skin changes and abnormal color;     Neurovascular  Dorsalis pedis: 1+  Posterior tibial: 1+    Muscle Strength  Ankle dorsiflexion: 4  Ankle plantar flexion: 4  Ankle inversion: 4  Ankle eversion: 4      Left Foot/Ankle      Inspection and Palpation  Swelling: dorsum   Skin Exam: skin changes and abnormal color;     Neurovascular  Dorsalis pedis: 1+  Posterior tibial: 1+    Muscle Strength  Ankle dorsiflexion: 4  Ankle plantar flexion: 4  Ankle inversion: 4  Ankle eversion: 4      Neurologic: Positive paresthesias reported   Dermatologic: Nails 1 through 5 bilateral thick elongated discolored subungual debris, atrophic soft tissue skin changes noted bilateral foot  Vascular: +1 nonpitting edema noted bilateral foot, positive varicosities and telangiectasias bilateral foot, skin temperature gradient warm to cool from proximal distal bilateral foot, pedal hair growth is absent bilateral foot     Assessment:         1. Diabetic polyneuropathy associated with type 2 diabetes mellitus    2. Disease of nail       Plan:     Devan Bautista was seen today for   Chief Complaint   Patient presents with    Nail Care       Assessment & Plan           Routine Foot Care    Date/Time: 4/9/2025 8:45 AM    Performed by: Calixto Escalante DPM  Authorized by: Calixto Escalante DPM    Consent Done?:  Yes (Verbal)  Hyperkeratotic Skin Lesions?: No      Nail Care Type:  Debride  Location(s): All  (Left 1st Toe, Left 3rd Toe, Left 2nd Toe, Left 4th Toe, Left 5th Toe, Right 1st Toe, Right 2nd Toe, Right 3rd Toe, Right 4th Toe and Right 5th Toe)  Patient tolerance:  Patient tolerated the procedure well with no immediate complications       1. Patient was examined and evaluated.    2. Patient was advised to  continue with daily foot monitoring.  Patient will continue efforts proper glycemic control, lowering hemoglobin A1c, adherence to diabetic medication regimen  3. Discussed with patient the etiology of nail fungus and as possible secondary issue to prior nail trauma.  Discussed with patient OTC topical conservative treatments such as Vicks vapor rub, tea tree oil as well as coconut oil.  Discussed with patient risks and benefits oral Lamisil therapy.   4. Patient will follow up in 2-3 months or p.r.n. for complaints      Najma Escalante DPM  [id number] [id number] [phone]      This note was created using [employer] direct voice recognition software. Note may have occasional typographical errors that may not have been identified and edited despite initial review prior to signing.